# Patient Record
Sex: MALE | Race: WHITE
[De-identification: names, ages, dates, MRNs, and addresses within clinical notes are randomized per-mention and may not be internally consistent; named-entity substitution may affect disease eponyms.]

---

## 2019-11-15 ENCOUNTER — HOSPITAL ENCOUNTER (EMERGENCY)
Dept: HOSPITAL 74 - JER | Age: 54
Discharge: HOME | End: 2019-11-15
Payer: COMMERCIAL

## 2019-11-15 VITALS — BODY MASS INDEX: 31.9 KG/M2

## 2019-11-15 VITALS — SYSTOLIC BLOOD PRESSURE: 142 MMHG | HEART RATE: 92 BPM | DIASTOLIC BLOOD PRESSURE: 89 MMHG | TEMPERATURE: 98.2 F

## 2019-11-15 DIAGNOSIS — I10: ICD-10-CM

## 2019-11-15 DIAGNOSIS — F10.120: Primary | ICD-10-CM
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
## 2019-11-15 DIAGNOSIS — Z96.642: ICD-10-CM

## 2019-11-15 NOTE — PDOC
Attending Attestation





- Resident


Resident Name: Juan Grijalva





- ED Attending Attestation


I have performed the following: I have examined & evaluated the patient, The 

case was reviewed & discussed with the resident, I agree w/resident's findings 

& plan, Exceptions are as noted





- HPI


HPI: 





11/15/19 20:04


Mr. Nic Larson is a 54-year-old male presenting to the emergency department 

accompanied by police officers due to intoxication 


Pt states he came to the attention of the police because he got into a verbal 

altercation with "some kids"


Patient denies traumatic injury  


Patient denies chest or abdominal pain, nausea or vomiting


 





- Physicial Exam


PE: 





11/15/19 20:05


On examination:


Patient is awake and alert


He is walking throughout the emergency department, speaking but his speech is 

slurred, and largely he is unable to answer questions appropriately


Moist mucous membrane


Heart is regular rate and rhythm


Lungs are clear to auscultation


No abdominal tenderness


Moving all extremities with no limitation


No obvious deformities








- Medical Decision Making





11/15/19 20:06


54-year-old male presenting to the emergency department accompanied by police 

officers due to public intoxication


Currently there are no signs of traumatic injury


Patient is ambulatory throughout the department with no limitation


Speech is slurred


At this moment he is an unsafe discharge as he is unable to disclose to us his 

address and is not willing to give us the name and number of a family member 

who could accompany him home


We will continue to observe for sobriety


 


11/15/19 21:29


Pt able to give me his address and is ambulatory with a steady gait


Will discharge to home


Follow up with PMD


Return to the ER for any other concerns or complaints

## 2019-11-15 NOTE — PDOC
History of Present Illness





- General


Chief Complaint: Alcohol intoxication


Stated Complaint: INTOXICATED


Time Seen by Provider: 11/15/19 19:17


History Source: Patient


Exam Limitations: No Limitations





- History of Present Illness


Initial Comments: 





11/15/19 19:28


Patient is a 54M with history of L hip replacement brought in today for 

intoxication by [employer]. Patient states that he drank a large amount of liquor 

today. Denies headache, chest pain, shortness of breath, abdominal pain, leg 

pain, dysuria. Patient is intoxicated and refuses to answer question. Patient 

put urine on hand and purposely exposed staff member's hand to that urine. No 

other complaints from patient.





Past History





- Past Medical History


Allergies/Adverse Reactions: 


 Allergies











Allergy/AdvReac Type Severity Reaction Status Date / Time


 


No Known Allergies Allergy   Verified 10/11/16 17:08











Home Medications: 


Ambulatory Orders





Ibuprofen 800 mg PO TID #30 tablet 12/01/18 








Anemia: No


Asthma: No


Cancer: No


Cardiac Disorders: No


COPD: No


HTN: Yes


Kidney Stones: No


Liver Disease: No





- Surgical History


Abdominal Surgery: No


Appendectomy: No


Cardiac Surgery: No





- Psycho Social/Smoking Cessation Hx


Smoking Status: No


Smoking History: Unknown if ever smoked


Have you smoked in the past 12 months: No


Number of Cigarettes Smoked Daily: 0


Hx Alcohol Use: Yes


Drug/Substance Use Hx: Yes


Substance Use Type: Alcohol





**Review of Systems





- Review of Systems


Able to Perform ROS?: No (2/2 patient intoxication)





*Physical Exam





- Vital Signs


 Last Vital Signs











Temp Pulse Resp BP Pulse Ox


 


 98.2 F   92 H  18   142/89   95 


 


 11/15/19 18:15  11/15/19 18:15  11/15/19 18:15  11/15/19 18:15  11/15/19 18:15














- Physical Exam


Comments: 





11/15/19 20:02


GENERAL: Awake, alert, and fully oriented, in no acute distress, intoxicated


HEAD: No signs of trauma, normocephalic, atraumatic


EYES: PERRLA, EOMI, sclera anicteric, conjunctiva clear


ENT: Auricles normal inspection, hearing grossly normal, nares patent, 

oropharynx clear without


exudates. Moist mucosa


NECK: Normal ROM, supple, no lymphadenopathy, JVD, or masses


LUNGS: No distress, speaks full sentences, clear to auscultation bilaterally


HEART: Regular rate and rhythm, normal S1 and S2, no murmurs, rubs or gallops, 

peripheral pulses normal and equal bilaterally.


ABDOMEN: Soft, nontender, normoactive bowel sounds.  No guarding, no rebound.  

No masses


EXTREMITIES: Normal inspection, Normal range of motion, no edema.  No clubbing 

or cyanosis.


NEUROLOGICAL: Cranial nerves II through XII grossly intact.  Normal speech, 

normal gait, no focal sensorimotor deficits


PSYCH: Denies SI/HI. 


SKIN: Warm, Dry, normal turgor, no rashes or lesions noted.








Medical Decision Making





- Medical Decision Making





11/15/19 20:04


Patient is 54M here today with alcohol intoxication. Vitals normal and stable. 

No other complaints. Patient is alert and oriented, but refuses to state a plan 

for going home. Will monitor for sobriety.


11/15/19 20:54


Patient tolerating PO. Ambulating without difficulty. No slurred speech. 

Patient states he lives about 1 mile from the hospital. Will discharge home.





Discharge





- Discharge Information


Problems reviewed: Yes


Clinical Impression/Diagnosis: 


 Alcohol intoxication





Condition: Good


Disposition: HOME





- Admission


No





- Follow up/Referral





- Patient Discharge Instructions


Patient Printed Discharge Instructions:  [facility] for Alcohol Abuse


Additional Instructions: 


Please avoid drinking alcohol in the future. 





Please return to the ED if you have any new, worsening or concerning symptoms, 

especially fever, chills, and increasing pain.





[facility] is available for alcohol rehab, they are at [address] [facility]. Their phone 

number [phone]





- Post Discharge Activity

## 2022-12-14 ENCOUNTER — HOSPITAL ENCOUNTER (EMERGENCY)
Dept: HOSPITAL 74 - FER | Age: 57
Discharge: HOME | End: 2022-12-14
Payer: COMMERCIAL

## 2022-12-14 VITALS
SYSTOLIC BLOOD PRESSURE: 141 MMHG | HEART RATE: 64 BPM | TEMPERATURE: 98.5 F | DIASTOLIC BLOOD PRESSURE: 98 MMHG | RESPIRATION RATE: 16 BRPM

## 2022-12-14 VITALS — BODY MASS INDEX: 35.7 KG/M2

## 2022-12-14 DIAGNOSIS — R05.1: Primary | ICD-10-CM

## 2022-12-14 DIAGNOSIS — M79.10: ICD-10-CM

## 2022-12-14 DIAGNOSIS — J02.9: ICD-10-CM
